# Patient Record
Sex: MALE | Race: WHITE | NOT HISPANIC OR LATINO | Employment: FULL TIME | URBAN - METROPOLITAN AREA
[De-identification: names, ages, dates, MRNs, and addresses within clinical notes are randomized per-mention and may not be internally consistent; named-entity substitution may affect disease eponyms.]

---

## 2019-10-05 ENCOUNTER — TRANSFERRED RECORDS (OUTPATIENT)
Dept: HEALTH INFORMATION MANAGEMENT | Facility: CLINIC | Age: 54
End: 2019-10-05

## 2020-06-16 ENCOUNTER — OFFICE VISIT (OUTPATIENT)
Dept: FAMILY MEDICINE | Facility: CLINIC | Age: 55
End: 2020-06-16
Payer: COMMERCIAL

## 2020-06-16 VITALS
HEIGHT: 70 IN | WEIGHT: 217.8 LBS | TEMPERATURE: 96.2 F | BODY MASS INDEX: 31.18 KG/M2 | SYSTOLIC BLOOD PRESSURE: 126 MMHG | OXYGEN SATURATION: 97 % | HEART RATE: 62 BPM | RESPIRATION RATE: 14 BRPM | DIASTOLIC BLOOD PRESSURE: 96 MMHG

## 2020-06-16 DIAGNOSIS — M25.572 ACUTE LEFT ANKLE PAIN: Primary | ICD-10-CM

## 2020-06-16 DIAGNOSIS — I81 PORTAL VEIN THROMBOSIS: ICD-10-CM

## 2020-06-16 PROCEDURE — 99204 OFFICE O/P NEW MOD 45 MIN: CPT | Performed by: FAMILY MEDICINE

## 2020-06-16 RX ORDER — ACETAMINOPHEN 500 MG
500-1000 TABLET ORAL EVERY 4 HOURS PRN
Start: 2020-06-16

## 2020-06-16 RX ORDER — FEXOFENADINE HCL 60 MG/1
60 TABLET, FILM COATED ORAL
COMMUNITY
Start: 2019-08-29

## 2020-06-16 ASSESSMENT — MIFFLIN-ST. JEOR: SCORE: 1844.18

## 2020-06-16 NOTE — PATIENT INSTRUCTIONS
Acetaminophen 500 mg orally 1-2 tablets every 4-6 hrs as needed for pain.  Avoid ibuprofen or naproxen since you are taking xarelto.    Schedule physical therapy.    If with worsening pain or pain interfering with activities after the next 1-2 weeks, follow up in the clinic.    Activity as tolerated.  Stop any activity that increases pain.    See your deep vein thrombosis specialist as planned.      Patient Education     Arthralgia    Arthralgia is the term for pain in or around the joint. It is a symptom, not a disease. This pain may involve one or more joints. In some cases, the pain moves from joint to joint.  There are many causes for joint pain. These include:    Injury    Osteoarthritis (wearing out of the joint surface)    Gout (inflammation of the joint due to crystals in the joint fluid)    Infection inside the joint      Bursitis (inflammation of the fluid-filled sacs around the joint)    Autoimmune disorders such as rheumatoid arthritis or lupus    Tendonitis (inflammation of chords that attach muscle to bone)  Home care    Rest the involved joint(s) until your symptoms improve.     You may be prescribed pain medicine. If none is prescribed, you may use acetaminophen or ibuprofen to control pain and inflammation.    Follow-up care  Follow up with your healthcare provider or as advised.  When to seek medical advice  Contact your healthcare provider right away if any of the following occurs:    Pain, swelling, or redness of joint increases    Pain worsens or recurs after a period of improvement    Pain moves to other joints    You cannot bear weight on the affected joint     You cannot move the affected joint    Joint appears deformed    New rash appears    Fever of 100.4 F (38 C) or higher, or as directed by your healthcare provider  Date Last Reviewed: 3/1/2017    5820-7253 [employer]. [address] [place] [address], [place] [address]. All rights reserved. This information is not intended as a  substitute for professional medical care. Always follow your healthcare professional's instructions.

## 2020-06-16 NOTE — PROGRESS NOTES
Subjective     Jez Barron is a 54 year old male who presents to clinic today for the following health issues:  Chief Complaint   Patient presents with     Establish Care     Pt here to establish care with new provider.     Musculoskeletal Problem     Pt also having left foot pain.       HPI   Joint Pain    Onset: 4-5 days    Description:   Location: left foot, more on the side  Character: Sharp and Dull ache    Intensity: 1-6/10    Progression of Symptoms: worse with walking    Accompanying Signs & Symptoms:  Other symptoms: radiation of pain to down into lower foot and weakness of ankle if he twists or moves wrong     History:   Previous similar pain: no       Precipitating factors:   Trauma or overuse: no   Denies new activities.  Patient states he just started a walking exercise program last week.    Alleviating factors:  Improved by: acetaminophen helps a little    Therapies Tried and outcome: stretching does not help    Patient has been diagnosed with portal vein thrombosis, and is seeing a gastroenterologist.  No need for any referrals per patient.        Patient Active Problem List   Diagnosis     Portal vein thrombosis     History reviewed. No pertinent surgical history.    Social History     Tobacco Use     Smoking status: Never Smoker     Smokeless tobacco: Never Used   Substance Use Topics     Alcohol use: Yes     Comment: social     Family History   Problem Relation Age of Onset     Lupus Sister      Heart Disease Sister      Cancer Maternal Uncle          Current Outpatient Medications   Medication Sig Dispense Refill     acetaminophen (TYLENOL) 500 MG tablet Take 1-2 tablets (500-1,000 mg) by mouth every 4 hours as needed for pain       fexofenadine (ALLEGRA) 60 MG tablet Take 60 mg by mouth       rivaroxaban ANTICOAGULANT (XARELTO) 10 MG TABS tablet Take 10 mg by mouth       No Known Allergies  Reviewed and updated as needed this visit by Provider         Review of Systems   Constitutional,  HEENT, cardiovascular, pulmonary, GI, , musculoskeletal, neuro, skin, endocrine and psych systems are negative, except as otherwise noted.      Objective    There were no vitals taken for this visit.  There is no height or weight on file to calculate BMI.  Physical Exam   GEN: alert, oriented x 3, NAD, ambulatory with mild antalgia  LEFT FOOT/ ANKLE: no deformity/discoloration/swelling; mild TTP just distal to the lateral malleolus with no palpable crepitation or mass; full range of motion with mild pain on inversion only intermittently; no instability; no tenderness on toes    Diagnostic Test Results:  none         Assessment & Plan     Jez was seen today for establish care and musculoskeletal problem.    Diagnoses and all orders for this visit:    Acute left ankle pain  -     acetaminophen (TYLENOL) 500 MG tablet; Take 1-2 tablets (500-1,000 mg) by mouth every 4 hours as needed for pain  -     PHYSICAL THERAPY REFERRAL; Future  No red flag signs to suspect fracture. Defer imaging.  Possibly recent strain from new exercise routine.  Discussed safe use of acetaminophen.  Discussed benefits of physical therapy. Patient concurs.  Activity as tolerated.  Return precautions discussed and given to patient.    Portal vein thrombosis  Sees GI.      Patient Instructions   Acetaminophen 500 mg orally 1-2 tablets every 4-6 hrs as needed for pain.  Avoid ibuprofen or naproxen since you are taking xarelto.    Schedule physical therapy.    If with worsening pain or pain interfering with activities after the next 1-2 weeks, follow up in the clinic.    Activity as tolerated.  Stop any activity that increases pain.    See your deep vein thrombosis specialist as planned.      Patient Education     Arthralgia    Arthralgia is the term for pain in or around the joint. It is a symptom, not a disease. This pain may involve one or more joints. In some cases, the pain moves from joint to joint.  There are many causes for joint pain.  These include:    Injury    Osteoarthritis (wearing out of the joint surface)    Gout (inflammation of the joint due to crystals in the joint fluid)    Infection inside the joint      Bursitis (inflammation of the fluid-filled sacs around the joint)    Autoimmune disorders such as rheumatoid arthritis or lupus    Tendonitis (inflammation of chords that attach muscle to bone)  Home care    Rest the involved joint(s) until your symptoms improve.     You may be prescribed pain medicine. If none is prescribed, you may use acetaminophen or ibuprofen to control pain and inflammation.    Follow-up care  Follow up with your healthcare provider or as advised.  When to seek medical advice  Contact your healthcare provider right away if any of the following occurs:    Pain, swelling, or redness of joint increases    Pain worsens or recurs after a period of improvement    Pain moves to other joints    You cannot bear weight on the affected joint     You cannot move the affected joint    Joint appears deformed    New rash appears    Fever of 100.4 F (38 C) or higher, or as directed by your healthcare provider  Date Last Reviewed: 3/1/2017    0652-5209 [employer]. [address] [place] [address]. All rights reserved. This information is not intended as a substitute for professional medical care. Always follow your healthcare professional's instructions.               Return in about 2 weeks (around 6/30/2020) for if worsening.    Alireza Burrows MD  [facility]

## 2020-07-10 ENCOUNTER — HOSPITAL ENCOUNTER (OUTPATIENT)
Dept: PHYSICAL THERAPY | Facility: CLINIC | Age: 55
Setting detail: THERAPIES SERIES
End: 2020-07-10
Attending: FAMILY MEDICINE
Payer: COMMERCIAL

## 2020-07-10 DIAGNOSIS — M25.572 ACUTE LEFT ANKLE PAIN: ICD-10-CM

## 2020-07-10 PROCEDURE — 97110 THERAPEUTIC EXERCISES: CPT | Mod: GP | Performed by: PHYSICAL THERAPIST

## 2020-07-10 PROCEDURE — 97161 PT EVAL LOW COMPLEX 20 MIN: CPT | Mod: GP | Performed by: PHYSICAL THERAPIST

## 2020-07-10 NOTE — PROGRESS NOTES
07/10/20 0800   General Information   Type of Visit Initial OP Ortho PT Evaluation   Start of Care Date 07/10/20   Referring Physician Dr Mariusz Burrows   Patient/Family Goals Statement decreased pain; get back to jogging   Orders Evaluate and Treat   Date of Order 06/16/20   Medical Diagnosis acute L ankle pain   Surgical/Medical history reviewed Yes   Precautions/Limitations no known precautions/limitations   Body Part(s)   Body Part(s) Ankle/Foot   Presentation and Etiology   Pertinent history of current problem (include personal factors and/or comorbidities that impact the POC) 56 yo male c/o L ankle pain 6/10 with push off duriing, stairs; does not put full wt on L foot duriing ADLs due to pain; PMH unremarkable; reports he had ACL repair but no scars are present   Impairments A. Pain;B. Decreased WB tolerance   Functional Limitations perform desired leisure / sports activities   Symptom Location L lateral ankle;    How/Where did it occur From insidious onset   Onset date of current episode/exacerbation 06/16/20  (date of PT order)   Pain rating (0-10 point scale) Best (/10);Worst (/10)   Best (/10) 2   Worst (/10) 6-8   Pain quality A. Sharp;C. Aching   Frequency of pain/symptoms C. With activity   Pain/symptoms exacerbated by B. Walking   Pain/symptoms eased by C. Rest   Progression of symptoms since onset: Worsened   Prior Level of Function   Functional Level Prior Comment stopped working out due to pain   Current Level of Function   [facility]   Home/community accessibility stairs to basement bedroom   Fall Risk Screen   Fall screen completed by PT   Have you fallen 2 or more times in the past year? No   Is patient a fall risk? No   Ankle/Foot Objective Findings   Side (if bilateral, select both right and left) Left   Observation NAD    Integumentary no swelling, bruising    Posture Normal   Gait/Locomotion slight antalgic gait later in PT eval; initially no deviation   Balance/  Proprioception (Single Leg Stance) >30 sec EO   Foot Position In Standing supinated    Ankle/Foot ROM Comment painful   Palpation tender deltoid ligament    Left DF (Knee Ext) AROM 0, 18 knee flxn   Left DF/Inversion Strength 4+ pain   Left DF/Eversion Strength 4 pain   Left PF/Inversion Strength 5-    Left PF/Eversion Strength 5- less pain   Left PF Strength 5- pain in standing    Left Gastroc (in WB) Flexibility 25   Planned Therapy Interventions   Planned Therapy Interventions balance training;manual therapy;strengthening;stretching;ROM   Clinical Impression   Criteria for Skilled Therapeutic Interventions Met yes, treatment indicated   PT Diagnosis ankle pain and weakness   Influenced by the following impairments pain    Functional limitations due to impairments pain with ADLs   Clinical Presentation Stable/Uncomplicated   Clinical Decision Making (Complexity) Low complexity   Predicted Duration of Therapy Intervention (days/wks) 10 weeks   Risk & Benefits of therapy have been explained Yes   Patient, Family & other staff in agreement with plan of care Yes   Clinical Impression Comments pt will benefit from skilled interventions to decrease pain, improve strength, ROM, functional moility    ORTHO GOALS   PT Ortho Eval Goals 1;2;3;4   Ortho Goal 1   Goal Description jogging 1 mile without pain   Target Date 09/18/20   Ortho Goal 2   Goal Description walk without pain   Target Date 08/07/20   Ortho Goal 3   Goal Description up/down full flight of stairs no pain   Target Date 08/21/20   Ortho Goal 4   Goal Description independent in HEP incorporated into regular fitness routine   Target Date 09/18/20   Total Evaluation Time   PT Anastasiya, Low Complexity Minutes (12099) 10

## 2020-07-13 ENCOUNTER — E-VISIT (OUTPATIENT)
Dept: FAMILY MEDICINE | Facility: CLINIC | Age: 55
End: 2020-07-13
Payer: COMMERCIAL

## 2020-07-13 DIAGNOSIS — Z86.19 HISTORY OF SHINGLES: Primary | ICD-10-CM

## 2020-07-13 PROCEDURE — 99207 ZZC NO BILLABLE SERVICE THIS VISIT: CPT | Performed by: FAMILY MEDICINE

## 2020-07-13 NOTE — PATIENT INSTRUCTIONS
Thank you for choosing us for your care. I think an in-clinic visit would be best next steps based on your symptoms. Please schedule a clinic appointment; you won t be charged for this e-visit.      You can schedule an appointment right here in [facility], or call [phone]

## 2020-07-15 ENCOUNTER — OFFICE VISIT (OUTPATIENT)
Dept: FAMILY MEDICINE | Facility: CLINIC | Age: 55
End: 2020-07-15
Payer: COMMERCIAL

## 2020-07-15 ENCOUNTER — HOSPITAL ENCOUNTER (OUTPATIENT)
Dept: PHYSICAL THERAPY | Facility: CLINIC | Age: 55
Setting detail: THERAPIES SERIES
End: 2020-07-15
Attending: FAMILY MEDICINE
Payer: COMMERCIAL

## 2020-07-15 VITALS
WEIGHT: 220.6 LBS | SYSTOLIC BLOOD PRESSURE: 138 MMHG | TEMPERATURE: 97.8 F | BODY MASS INDEX: 31.58 KG/M2 | HEIGHT: 70 IN | DIASTOLIC BLOOD PRESSURE: 88 MMHG | HEART RATE: 71 BPM | OXYGEN SATURATION: 97 %

## 2020-07-15 DIAGNOSIS — B02.9 HERPES ZOSTER WITHOUT COMPLICATION: Primary | ICD-10-CM

## 2020-07-15 PROCEDURE — 97110 THERAPEUTIC EXERCISES: CPT | Mod: GP | Performed by: PHYSICAL THERAPIST

## 2020-07-15 PROCEDURE — 99213 OFFICE O/P EST LOW 20 MIN: CPT | Performed by: FAMILY MEDICINE

## 2020-07-15 RX ORDER — VALACYCLOVIR HYDROCHLORIDE 1 G/1
1000 TABLET, FILM COATED ORAL 3 TIMES DAILY
Qty: 21 TABLET | Refills: 0 | Status: SHIPPED | OUTPATIENT
Start: 2020-07-15 | End: 2020-08-17

## 2020-07-15 RX ORDER — OXYCODONE HYDROCHLORIDE 5 MG/1
5 TABLET ORAL EVERY 6 HOURS PRN
Qty: 10 TABLET | Refills: 0 | Status: SHIPPED | OUTPATIENT
Start: 2020-07-15 | End: 2020-08-17

## 2020-07-15 ASSESSMENT — MIFFLIN-ST. JEOR: SCORE: 1851.89

## 2020-07-15 NOTE — PROGRESS NOTES
Subjective     Jez Barron is a 55 year old male who presents to clinic today for the following health issues:    HPI     Chief Complaint   Patient presents with     Derm Problem     Recurrent Shingles Rash on Right Thigh      Health Maintenance     patient had colonoscopy at [facility] 10/2019- repeat 10 yrs, sent message to [employer].        Rash  Onset: 3 days ago     Description:   Location: Right Thigh   Character: raised, red  Itching (Pruritis): no     Progression of Symptoms:  worsening    Accompanying Signs & Symptoms:  Fever: no   Body aches or joint pain: YES  Sore throat symptoms: no   Recent cold symptoms: no     History:   Previous similar rash: YES- Same spot - Shingles     Precipitating factors:   Exposure to similar rash: no   New exposures: None   Recent travel: no     Alleviating factors:  None     Therapies Tried and outcome: none     Verified above history with patient.      Patient Active Problem List   Diagnosis     Portal vein thrombosis     History reviewed. No pertinent surgical history.    Social History     Tobacco Use     Smoking status: Never Smoker     Smokeless tobacco: Never Used   Substance Use Topics     Alcohol use: Yes     Comment: social     Family History   Problem Relation Age of Onset     Lupus Sister      Heart Disease Sister      Cancer Maternal Uncle          Current Outpatient Medications   Medication Sig Dispense Refill     acetaminophen (TYLENOL) 500 MG tablet Take 1-2 tablets (500-1,000 mg) by mouth every 4 hours as needed for pain       fexofenadine (ALLEGRA) 60 MG tablet Take 60 mg by mouth       oxyCODONE (ROXICODONE) 5 MG tablet Take 1 tablet (5 mg) by mouth every 6 hours as needed for moderate to severe pain 10 tablet 0     rivaroxaban ANTICOAGULANT (XARELTO) 10 MG TABS tablet Take 10 mg by mouth daily        valACYclovir (VALTREX) 1000 mg tablet Take 1 tablet (1,000 mg) by mouth 3 times daily for 7 days 21 tablet 0     No Known Allergies    Reviewed  "and updated as needed this visit by Provider         Review of Systems     C: NEGATIVE for fever, chills, change in weight  ENT: NEG for nasal congestion, sore throat, ear pain or sinus pain  INTEGUMENTARY/SKIN: see above  MUSCULOSKELETAL: see above  H: NEGATIVE for bleeding problems      Objective    /88 (BP Location: Right arm, Patient Position: Chair, Cuff Size: Adult Regular)   Pulse 71   Temp 97.8  F (36.6  C) (Tympanic)   Ht 1.778 m (5' 10\")   Wt 100.1 kg (220 lb 9.6 oz)   SpO2 97%   BMI 31.65 kg/m    Body mass index is 31.65 kg/m .  Physical Exam   GEN: alert, oriented x 3, NAD  SKIN: good turgor; one cluster of vesicles with erythematous haloes on the right medial proximal thigh    Diagnostic Test Results:  none         Assessment & Plan     Jez was seen today for derm problem and health maintenance.    Diagnoses and all orders for this visit:    Herpes zoster without complication  -     valACYclovir (VALTREX) 1000 mg tablet; Take 1 tablet (1,000 mg) by mouth 3 times daily for 7 days  -     oxyCODONE (ROXICODONE) 5 MG tablet; Take 1 tablet (5 mg) by mouth every 6 hours as needed for moderate to severe pain      By history, consistent with HSV  Discussed cause, course and treatment of shingles.  Antiviral may decrease intensity and shorten course of the rash and pain.  Symptom may persist for several days to a few weeks.  Discussed may have severe pain - offered limited use RX analgesic. Patient concurred. Advised safe use of oxycodone.  If with severe pain, shortness of breath, spreading rash or other worrisome sx, see provider again.  May have shingles vaccine in a few months if desired.  Return precautions discussed and given to patient.          Patient Instructions   If you take oxycodone for severe pain later on, avoid drinking alcohol with it, and avoid driving while taking the pain medication.    Patient Education     Shingles  Shingles is a viral infection caused by the same virus " that causes chicken pox. Anyone who has had chicken pox may get shingles later in life. The virus stays in the body, but remains asleep (dormant). Shingles often occurs in older persons or persons with lowered immunity. But it can affect anyone at any age.  Shingles starts as a tingling patch of skin on one side of the body. Small, painful blisters may then appear. The rash rarely spreads to other parts of the body.  Exposure to shingles can't cause shingles. However, it can cause chicken pox in anyone who has not had chicken pox or has not been vaccinated. The contagious period ends when all blisters have crusted over, generally 1 to 2 weeks after the illness starts.  After the blisters heal, the affected skin may be sensitive or painful for weeks or months, gradually resolving over time. But, sometimes this can last longer and be permanent (called postherpetic neuralgia.)  Shingles vaccines are available. Vaccination can help prevent shingles or make it less painful. It is generally recommended for adults older than 50, even if you've had singles in the past. Talk with your healthcare provider about when to get vaccinated and which vaccine is best for you.  Home care    Medicines may be prescribed to help relieve pain. Take these medicines as directed. Ask your healthcare provider or pharmacist before using over-the-counter medicines for helping treat pain and itching.    In certain cases, antiviral medicines may be prescribed to reduce pain, shorten the illness, and prevent neuralgia. Take these medicines as directed.    Compresses made from a solution of cool water mixed with cornstarch or baking soda may help relieve pain and itching.     Gently wash skin daily with soap and water to help prevent infection. Be certain to rinse off all of the soap, which can be irritating.    Trim fingernails and try not to scratch. Scratching the sores may leave scars.    Stay home from work or school until all blisters have  formed a crust and you are no longer contagious.  Follow-up care  Follow up with your healthcare provider, or as directed.  When to seek medical advice    Fever of 100.4 F (38 C) or higher, or as directed by your healthcare provider    Affected skin is on the face or neck and any of the following occur:  ? Headache  ? Eye pain  ? Changes in vision  ? Sores near the eye  ? Weakness of facial muscles    Blisters occurring on new areas of the body    Pain, redness, or swelling of a joint    Signs of skin infection: colored drainage from the sores, warmth, increasing redness, fever, or increasing pain  Date Last Reviewed: 4/1/2018 2000-2019 [employer]. [address] [place] [address], [place] [address]. All rights reserved. This information is not intended as a substitute for professional medical care. Always follow your healthcare professional's instructions.               No follow-ups on file.    Alireza Burrows MD  [facility]

## 2020-07-15 NOTE — PATIENT INSTRUCTIONS
If you take oxycodone for severe pain later on, avoid drinking alcohol with it, and avoid driving while taking the pain medication.    Patient Education     Shingles  Shingles is a viral infection caused by the same virus that causes chicken pox. Anyone who has had chicken pox may get shingles later in life. The virus stays in the body, but remains asleep (dormant). Shingles often occurs in older persons or persons with lowered immunity. But it can affect anyone at any age.  Shingles starts as a tingling patch of skin on one side of the body. Small, painful blisters may then appear. The rash rarely spreads to other parts of the body.  Exposure to shingles can't cause shingles. However, it can cause chicken pox in anyone who has not had chicken pox or has not been vaccinated. The contagious period ends when all blisters have crusted over, generally 1 to 2 weeks after the illness starts.  After the blisters heal, the affected skin may be sensitive or painful for weeks or months, gradually resolving over time. But, sometimes this can last longer and be permanent (called postherpetic neuralgia.)  Shingles vaccines are available. Vaccination can help prevent shingles or make it less painful. It is generally recommended for adults older than 50, even if you've had singles in the past. Talk with your healthcare provider about when to get vaccinated and which vaccine is best for you.  Home care    Medicines may be prescribed to help relieve pain. Take these medicines as directed. Ask your healthcare provider or pharmacist before using over-the-counter medicines for helping treat pain and itching.    In certain cases, antiviral medicines may be prescribed to reduce pain, shorten the illness, and prevent neuralgia. Take these medicines as directed.    Compresses made from a solution of cool water mixed with cornstarch or baking soda may help relieve pain and itching.     Gently wash skin daily with soap and water to help  prevent infection. Be certain to rinse off all of the soap, which can be irritating.    Trim fingernails and try not to scratch. Scratching the sores may leave scars.    Stay home from work or school until all blisters have formed a crust and you are no longer contagious.  Follow-up care  Follow up with your healthcare provider, or as directed.  When to seek medical advice    Fever of 100.4 F (38 C) or higher, or as directed by your healthcare provider    Affected skin is on the face or neck and any of the following occur:  ? Headache  ? Eye pain  ? Changes in vision  ? Sores near the eye  ? Weakness of facial muscles    Blisters occurring on new areas of the body    Pain, redness, or swelling of a joint    Signs of skin infection: colored drainage from the sores, warmth, increasing redness, fever, or increasing pain  Date Last Reviewed: 4/1/2018 2000-2019 [employer]. [address] [place] [address]. All rights reserved. This information is not intended as a substitute for professional medical care. Always follow your healthcare professional's instructions.

## 2020-07-15 NOTE — Clinical Note
Please abstract the following data from this visit with this patient into the appropriate field in Epic:    Tests that can be patient reported without a hard copy:        Other Tests found in the patient's chart through Chart Review/Care Everywhere:    Colonoscopy done by [facility] on this date: 10/5/2019 repeat 10 yrs     Note to [employer]: If this section is blank, no results were found via Chart Review/Care Everywhere.

## 2020-07-22 ENCOUNTER — HOSPITAL ENCOUNTER (OUTPATIENT)
Dept: PHYSICAL THERAPY | Facility: CLINIC | Age: 55
Setting detail: THERAPIES SERIES
End: 2020-07-22
Attending: FAMILY MEDICINE
Payer: COMMERCIAL

## 2020-07-22 PROCEDURE — 97110 THERAPEUTIC EXERCISES: CPT | Mod: GP | Performed by: PHYSICAL THERAPIST

## 2020-08-07 ENCOUNTER — HOSPITAL ENCOUNTER (OUTPATIENT)
Dept: PHYSICAL THERAPY | Facility: CLINIC | Age: 55
Setting detail: THERAPIES SERIES
End: 2020-08-07
Attending: FAMILY MEDICINE
Payer: COMMERCIAL

## 2020-08-07 PROCEDURE — 97110 THERAPEUTIC EXERCISES: CPT | Mod: GP | Performed by: PHYSICAL THERAPIST

## 2020-08-12 ENCOUNTER — HOSPITAL ENCOUNTER (OUTPATIENT)
Dept: PHYSICAL THERAPY | Facility: CLINIC | Age: 55
Setting detail: THERAPIES SERIES
End: 2020-08-12
Attending: FAMILY MEDICINE
Payer: COMMERCIAL

## 2020-08-12 DIAGNOSIS — M25.572 ACUTE LEFT ANKLE PAIN: Primary | ICD-10-CM

## 2020-08-12 PROCEDURE — 97110 THERAPEUTIC EXERCISES: CPT | Mod: GP | Performed by: PHYSICAL THERAPIST

## 2020-08-12 NOTE — PROGRESS NOTES
Jez Barron  1965  Alireza Burrows MD  [address] [place] [address]  Diagnosis:  L ankle pain Physical Therapy Progress Note  08/12/20 1300   Signing Clinician's Name / Credentials   Signing clinician's name / credentials Myranda Stafford PT, DPT   Session Number   Session Number 5/365  [facility] 7/10/20   Ortho Goal 1   Goal Description jogging 1 mile without pain   Target Date 09/18/20   Ortho Goal 2   Goal Description walk without pain   Target Date 08/07/20   Ortho Goal 3   Goal Description up/down full flight of stairs no pain   Target Date 08/21/20   Ortho Goal 4   Goal Description independent in HEP incorporated into regular fitness routine   Target Date 09/18/20   Subjective Report   Subjective Report Pain with walking and whenever moving a little off to side; really no better than when started one month ago.    Objective Measures   Objective Measures Objective Measure 3;Objective Measure 4   Objective Measure 1   Objective Measure active ROM  measured in prone   Details 8* DF KE, 12  DF KF;  60* PF    Objective Measure 2   Objective Measure PROM   Details 30* DF KE  wt bearing; 0* calc peter; 30 inversion   Objective Measure 3   Objective Measure Pain L lateral ankle/foot   Details 1-4/10; Awakend 2-3x during the night taking tylenol with sleep aide   Objective Measure 4   Objective Measure palpation   Details tender to palpation deltoid ligament   Therapeutic Procedure/exercise   Therapeutic Procedures: strength, endurance, ROM, flexibillity minutes (52931) 10   Skilled Intervention education and exercise instruction with manual facilitation   Patient Response pain with resisted eversion in DF or PF   Treatment Detail review of Y TB peter doing OK; G TB PF   Communication with other professionals   Communication with other professionals contacted Dr Stormy aguilar lack of improvement   Plan   Home program continue previous as tolerated   Plan for next session  recommend seeing ortho podiatrist    Total Session Time   Timed Code Treatment Minutes 10   Total Treatment Time (sum of timed and untimed services) 10

## 2020-08-12 NOTE — PROGRESS NOTES
Received abrame from phys therapist. Patient's pain has not improved after a month of therapy.    Next steps:  1) schedule ankle xray in clinic  2) referred to podiatry - please inform patient that he will be called to schedule appointment by  Ortho .

## 2020-08-13 ENCOUNTER — HOSPITAL ENCOUNTER (OUTPATIENT)
Dept: GENERAL RADIOLOGY | Facility: CLINIC | Age: 55
Discharge: HOME | End: 2020-08-13
Attending: FAMILY MEDICINE | Admitting: FAMILY MEDICINE
Payer: COMMERCIAL

## 2020-08-13 DIAGNOSIS — M25.572 ACUTE LEFT ANKLE PAIN: ICD-10-CM

## 2020-08-13 PROCEDURE — 73610 X-RAY EXAM OF ANKLE: CPT | Mod: LT

## 2020-08-13 NOTE — PROGRESS NOTES
I have called the patient and he already has an appt with Dr. Mock for Monday.  I spoke to our xray tech and she is looking into if Dr. Mock wants to do his own xrays.  We will call the patient back. Carolin SALOMON RN

## 2020-08-13 NOTE — PROGRESS NOTES
Per our clinic xray skye Masters.  She spoke to [facility] and they want to do the xray of ankle.  I have called the patient and given the general central scheduling number I was given to have patient call [phone] and ask to schedule xray with [facility]. Carolin SALOMON RN

## 2020-08-17 ENCOUNTER — OFFICE VISIT (OUTPATIENT)
Dept: PODIATRY | Facility: CLINIC | Age: 55
End: 2020-08-17
Payer: COMMERCIAL

## 2020-08-17 VITALS
BODY MASS INDEX: 31.5 KG/M2 | DIASTOLIC BLOOD PRESSURE: 85 MMHG | SYSTOLIC BLOOD PRESSURE: 128 MMHG | HEIGHT: 70 IN | WEIGHT: 220 LBS | HEART RATE: 59 BPM

## 2020-08-17 DIAGNOSIS — M76.72 PERONEAL TENDONITIS, LEFT: Primary | ICD-10-CM

## 2020-08-17 DIAGNOSIS — M25.572 ACUTE LEFT ANKLE PAIN: ICD-10-CM

## 2020-08-17 PROCEDURE — 99203 OFFICE O/P NEW LOW 30 MIN: CPT | Performed by: PODIATRIST

## 2020-08-17 ASSESSMENT — PAIN SCALES - GENERAL: PAINLEVEL: SEVERE PAIN (7)

## 2020-08-17 ASSESSMENT — MIFFLIN-ST. JEOR: SCORE: 1849.16

## 2020-08-17 NOTE — LETTER
8/17/2020         RE: Jez Barron  [address] [place] [facility] [address]        Dear Colleague,    Thank you for referring your patient, Jez Barron, to the [facility]. Please see a copy of my visit note below.    PATIENT HISTORY:  Jez Barron is a 55 year old male who presents to clinic for a painful left foot .  The patient describes the pain as sharp stabbing.  The patient relates the pain level is moderate.  The patient relates pain is located over the outside of the left foot and ankle.  The patient relates the pain has been present for the past several weeks.  The patient relates pain with ambulation.  The patient has tried shoes with little relief.       REVIEW OF SYSTEMS:  Constitutional, HEENT, cardiovascular, pulmonary, GI, , musculoskeletal, neuro, skin, endocrine and psych systems are negative, except as otherwise noted.     PAST MEDICAL HISTORY: No past medical history on file.     PAST SURGICAL HISTORY: No past surgical history on file.     MEDICATIONS:   Current Outpatient Medications:      acetaminophen (TYLENOL) 500 MG tablet, Take 1-2 tablets (500-1,000 mg) by mouth every 4 hours as needed for pain, Disp: , Rfl:      fexofenadine (ALLEGRA) 60 MG tablet, Take 60 mg by mouth, Disp: , Rfl:      rivaroxaban ANTICOAGULANT (XARELTO) 10 MG TABS tablet, Take 10 mg by mouth daily , Disp: , Rfl:      ALLERGIES:  No Known Allergies     SOCIAL HISTORY:   Social History     Socioeconomic History     Marital status:      Spouse name: Not on file     Number of children: Not on file     Years of education: Not on file     Highest education level: Not on file   Occupational History     Not on file   Social Needs     Financial resource strain: Not on file     Food insecurity     Worry: Not on file     Inability: Not on file     Transportation needs     Medical: Not on file     Non-medical: Not on file   Tobacco Use     Smoking status: Never  "Smoker     Smokeless tobacco: Never Used   Substance and Sexual Activity     Alcohol use: Yes     Comment: social     Drug use: Never     Sexual activity: Not on file   Lifestyle     Physical activity     Days per week: Not on file     Minutes per session: Not on file     Stress: Not on file   Relationships     Social connections     Talks on phone: Not on file     Gets together: Not on file     Attends [employer] service: Not on file     Active member of club or organization: Not on file     Attends meetings of clubs or organizations: Not on file     Relationship status: Not on file     Intimate partner violence     Fear of current or ex partner: Not on file     Emotionally abused: Not on file     Physically abused: Not on file     Forced sexual activity: Not on file   Other Topics Concern     Not on file   Social History Narrative     Not on file        FAMILY HISTORY:   Family History   Problem Relation Age of Onset     Lupus Sister      Heart Disease Sister      Cancer Maternal Uncle         EXAM:Vitals: /85   Pulse 59   Ht 1.778 m (5' 10\")   Wt 99.8 kg (220 lb)   BMI 31.57 kg/m    BMI= Body mass index is 31.57 kg/m .    Weight management plan: Patient was referred to their PCP to discuss a diet and exercise plan.    General appearance: Patient is alert and fully cooperative with history & exam.  No sign of distress is noted during the visit.     Psychiatric: Affect is pleasant & appropriate.  Patient appears motivated to improve health.     Respiratory: Breathing is regular & unlabored while sitting.     HEENT: Hearing is intact to spoken word.  Speech is clear.  No gross evidence of visual impairment that would impact ambulation.     Dermatologic: Skin is intact to left lower extremities without significant lesions, rash or abrasion.  No paronychia or evidence of soft tissue infection is noted.     Vascular: DP & PT pulses are intact & regular on the left.  No significant edema or varicosities noted.  " CFT and skin temperature is normal to the left lower extremities.     Neurologic: Lower extremity sensation is intact to light touch.  No evidence of weakness or contracture in the left lower extremities.  No evidence of neuropathy.     Musculoskeletal: Patient is ambulatory without assistive device or brace.  No gross ankle deformity noted.  No foot or ankle joint effusion is noted.  Noted pain on palpation of the peroneal tendons on the left foot and ankle.  One notes pain with pronation and plantar flexion against resistance on the left.  No surrounding erythema noted.    Recent radiographs were evaluated including AP, lateral and mortise views of the left ankle reveals  no cortical erosions or periosteal elevation.  All joint margins appear stable.  There is no apparent fracture or tumor formation noted.  There is no evidence of foreign body.    ASSESSMENT / PLAN:     ICD-10-CM    1. Peroneal tendonitis, left  M76.72    2. Acute left ankle pain  M25.572 Orthopedic & Spine  Referral       I have explained to Jez  about the conditions.  We discussed the nature of the condition as well as the treatment plan and expected length of recovery.  At this time, the patient was instructed on icing, stretching, tissue massage and support.  The patient was fitted with a Tri-Lock ankle brace that will aid in offloading the tension forces to the soft tissues and prevent further inflammation.  To reduce the amount of current inflammation, the patient was prescribed Voltaren gel to be applied to the peroneal tendon along with deep tissue massage.  The patient will return in four weeks for reevaluation if the symptoms do not resolve.      Jez verbalized agreement with and understanding of the rational for the diagnosis and treatment plan.  All questions were answered to best of my ability and the patient's satisfaction. The patient was advised to contact the clinic with any questions that may arise after the  clinic visit.      Disclaimer: This note consists of symbols derived from keyboarding, dictation and/or voice recognition software. As a result, there may be errors in the script that have gone undetected. Please consider this when interpreting information found in this chart.       BUTCH Littlejohn D.P.M., [email or web address].S.        Again, thank you for allowing me to participate in the care of your patient.        Sincerely,        Arcenio Littlejohn DPM

## 2020-08-17 NOTE — NURSING NOTE
"Chief Complaint   Patient presents with     Consult     \"started in June-Pain in lateral side of left ankle\" XR 8/13/20       Initial /85   Pulse 59   Ht 1.778 m (5' 10\")   Wt 99.8 kg (220 lb)   BMI 31.57 kg/m   Estimated body mass index is 31.57 kg/m  as calculated from the following:    Height as of this encounter: 1.778 m (5' 10\").    Weight as of this encounter: 99.8 kg (220 lb).  Medications and allergies reviewed.      Leti LANZA MA    "

## 2020-08-17 NOTE — PROGRESS NOTES
PATIENT HISTORY:  Jez Barron is a 55 year old male who presents to clinic for a painful left foot .  The patient describes the pain as sharp stabbing.  The patient relates the pain level is moderate.  The patient relates pain is located over the outside of the left foot and ankle.  The patient relates the pain has been present for the past several weeks.  The patient relates pain with ambulation.  The patient has tried shoes with little relief.       REVIEW OF SYSTEMS:  Constitutional, HEENT, cardiovascular, pulmonary, GI, , musculoskeletal, neuro, skin, endocrine and psych systems are negative, except as otherwise noted.     PAST MEDICAL HISTORY: No past medical history on file.     PAST SURGICAL HISTORY: No past surgical history on file.     MEDICATIONS:   Current Outpatient Medications:      acetaminophen (TYLENOL) 500 MG tablet, Take 1-2 tablets (500-1,000 mg) by mouth every 4 hours as needed for pain, Disp: , Rfl:      fexofenadine (ALLEGRA) 60 MG tablet, Take 60 mg by mouth, Disp: , Rfl:      rivaroxaban ANTICOAGULANT (XARELTO) 10 MG TABS tablet, Take 10 mg by mouth daily , Disp: , Rfl:      ALLERGIES:  No Known Allergies     SOCIAL HISTORY:   Social History     Socioeconomic History     Marital status:      Spouse name: Not on file     Number of children: Not on file     Years of education: Not on file     Highest education level: Not on file   Occupational History     Not on file   Social Needs     Financial resource strain: Not on file     Food insecurity     Worry: Not on file     Inability: Not on file     Transportation needs     Medical: Not on file     Non-medical: Not on file   Tobacco Use     Smoking status: Never Smoker     Smokeless tobacco: Never Used   Substance and Sexual Activity     Alcohol use: Yes     Comment: social     Drug use: Never     Sexual activity: Not on file   Lifestyle     Physical activity     Days per week: Not on file     Minutes per session: Not on file      "Stress: Not on file   Relationships     Social connections     Talks on phone: Not on file     Gets together: Not on file     Attends [employer] service: Not on file     Active member of club or organization: Not on file     Attends meetings of clubs or organizations: Not on file     Relationship status: Not on file     Intimate partner violence     Fear of current or ex partner: Not on file     Emotionally abused: Not on file     Physically abused: Not on file     Forced sexual activity: Not on file   Other Topics Concern     Not on file   Social History Narrative     Not on file        FAMILY HISTORY:   Family History   Problem Relation Age of Onset     Lupus Sister      Heart Disease Sister      Cancer Maternal Uncle         EXAM:Vitals: /85   Pulse 59   Ht 1.778 m (5' 10\")   Wt 99.8 kg (220 lb)   BMI 31.57 kg/m    BMI= Body mass index is 31.57 kg/m .    Weight management plan: Patient was referred to their PCP to discuss a diet and exercise plan.    General appearance: Patient is alert and fully cooperative with history & exam.  No sign of distress is noted during the visit.     Psychiatric: Affect is pleasant & appropriate.  Patient appears motivated to improve health.     Respiratory: Breathing is regular & unlabored while sitting.     HEENT: Hearing is intact to spoken word.  Speech is clear.  No gross evidence of visual impairment that would impact ambulation.     Dermatologic: Skin is intact to left lower extremities without significant lesions, rash or abrasion.  No paronychia or evidence of soft tissue infection is noted.     Vascular: DP & PT pulses are intact & regular on the left.  No significant edema or varicosities noted.  CFT and skin temperature is normal to the left lower extremities.     Neurologic: Lower extremity sensation is intact to light touch.  No evidence of weakness or contracture in the left lower extremities.  No evidence of neuropathy.     Musculoskeletal: Patient is " ambulatory without assistive device or brace.  No gross ankle deformity noted.  No foot or ankle joint effusion is noted.  Noted pain on palpation of the peroneal tendons on the left foot and ankle.  One notes pain with pronation and plantar flexion against resistance on the left.  No surrounding erythema noted.    Recent radiographs were evaluated including AP, lateral and mortise views of the left ankle reveals  no cortical erosions or periosteal elevation.  All joint margins appear stable.  There is no apparent fracture or tumor formation noted.  There is no evidence of foreign body.    ASSESSMENT / PLAN:     ICD-10-CM    1. Peroneal tendonitis, left  M76.72    2. Acute left ankle pain  M25.572 Orthopedic & Spine  Referral       I have explained to Jez  about the conditions.  We discussed the nature of the condition as well as the treatment plan and expected length of recovery.  At this time, the patient was instructed on icing, stretching, tissue massage and support.  The patient was fitted with a Tri-Lock ankle brace that will aid in offloading the tension forces to the soft tissues and prevent further inflammation.  To reduce the amount of current inflammation, the patient was prescribed Voltaren gel to be applied to the peroneal tendon along with deep tissue massage.  The patient will return in four weeks for reevaluation if the symptoms do not resolve.      Jez verbalized agreement with and understanding of the rational for the diagnosis and treatment plan.  All questions were answered to best of my ability and the patient's satisfaction. The patient was advised to contact the clinic with any questions that may arise after the clinic visit.      Disclaimer: This note consists of symbols derived from keyboarding, dictation and/or voice recognition software. As a result, there may be errors in the script that have gone undetected. Please consider this when interpreting information found in  this chart.       BUTCH Littlejohn D.P.M., VERITO.SAM.

## 2020-11-30 ENCOUNTER — ANCILLARY PROCEDURE (OUTPATIENT)
Dept: GENERAL RADIOLOGY | Facility: CLINIC | Age: 55
End: 2020-11-30
Attending: NURSE PRACTITIONER
Payer: COMMERCIAL

## 2020-11-30 ENCOUNTER — TELEPHONE (OUTPATIENT)
Dept: FAMILY MEDICINE | Facility: CLINIC | Age: 55
End: 2020-11-30

## 2020-11-30 ENCOUNTER — OFFICE VISIT (OUTPATIENT)
Dept: FAMILY MEDICINE | Facility: CLINIC | Age: 55
End: 2020-11-30
Payer: COMMERCIAL

## 2020-11-30 VITALS
OXYGEN SATURATION: 97 % | RESPIRATION RATE: 12 BRPM | DIASTOLIC BLOOD PRESSURE: 88 MMHG | BODY MASS INDEX: 32.61 KG/M2 | WEIGHT: 227.3 LBS | SYSTOLIC BLOOD PRESSURE: 130 MMHG | HEART RATE: 90 BPM | TEMPERATURE: 97.1 F

## 2020-11-30 DIAGNOSIS — S92.514A CLOSED NONDISPLACED FRACTURE OF PROXIMAL PHALANX OF LESSER TOE OF RIGHT FOOT, INITIAL ENCOUNTER: Primary | ICD-10-CM

## 2020-11-30 DIAGNOSIS — Z79.01 ON CONTINUOUS ORAL ANTICOAGULATION: ICD-10-CM

## 2020-11-30 DIAGNOSIS — M79.674 PAIN OF TOE OF RIGHT FOOT: ICD-10-CM

## 2020-11-30 DIAGNOSIS — R03.0 ELEVATED BLOOD PRESSURE READING WITHOUT DIAGNOSIS OF HYPERTENSION: ICD-10-CM

## 2020-11-30 PROBLEM — I82.890 SPLENIC VEIN THROMBOSIS: Status: ACTIVE | Noted: 2019-09-06

## 2020-11-30 PROBLEM — M54.50 LOW BACK PAIN: Status: ACTIVE | Noted: 2019-12-08

## 2020-11-30 PROBLEM — K55.069 SUPERIOR MESENTERIC VEIN THROMBOSIS (H): Status: ACTIVE | Noted: 2019-09-06

## 2020-11-30 PROBLEM — G89.4 CHRONIC PAIN DISORDER: Status: ACTIVE | Noted: 2019-01-28

## 2020-11-30 PROCEDURE — 90471 IMMUNIZATION ADMIN: CPT | Performed by: NURSE PRACTITIONER

## 2020-11-30 PROCEDURE — 73660 X-RAY EXAM OF TOE(S): CPT | Mod: RT | Performed by: RADIOLOGY

## 2020-11-30 PROCEDURE — 90682 RIV4 VACC RECOMBINANT DNA IM: CPT | Performed by: NURSE PRACTITIONER

## 2020-11-30 PROCEDURE — 99214 OFFICE O/P EST MOD 30 MIN: CPT | Mod: 25 | Performed by: NURSE PRACTITIONER

## 2020-11-30 RX ORDER — OXYCODONE HYDROCHLORIDE 5 MG/1
5 TABLET ORAL EVERY 6 HOURS PRN
Qty: 12 TABLET | Refills: 0 | Status: SHIPPED | OUTPATIENT
Start: 2020-11-30 | End: 2020-12-03

## 2020-11-30 NOTE — TELEPHONE ENCOUNTER
Reason for call:    Symptom or request:     Patient called stating that he has bruising and taking glipizide. And was told to call clinic if experiencing bruising, he reports its on his right foot little toe, the area is red, swollen and purpish in color. Reports/no recall of injury. Able to walk on it, reports slight pain.     [facility] pharmacy       Best Time:  any    Can we leave a detailed message on this number?  YES     Uyen MCCABE  Station

## 2020-11-30 NOTE — PATIENT INSTRUCTIONS
Left toe pain:  1. X-ray noted fracture of the right pinky toe.   2. Take oxycodone as needed for pain.   3. You can also take Tylenol 1000mg every 8 hours (max 3000mg/day). Recommend taking 4 hours after Ibuprofen.  4. Rest, wear boot for 1-2 weeks, and elevate as able.  5. Follow-up in 1-2 weeks if symptoms do not improve or sooner if symptoms worsen.

## 2020-11-30 NOTE — NURSING NOTE
"Initial BP (!) 150/102   Pulse 90   Temp 97.1  F (36.2  C) (Tympanic)   Resp 12   Wt 103.1 kg (227 lb 4.8 oz)   SpO2 97%   BMI 32.61 kg/m   Estimated body mass index is 32.61 kg/m  as calculated from the following:    Height as of 8/17/20: 1.778 m (5' 10\").    Weight as of this encounter: 103.1 kg (227 lb 4.8 oz). .      "

## 2020-11-30 NOTE — TELEPHONE ENCOUNTER
Pt stubbed his toe yesterday.    Today it is very bruised and numb feeling.      Appt today at 3:20.    Melissa Concepcion RN

## 2020-11-30 NOTE — PROGRESS NOTES
Subjective     Jez Barron is a 55 year old male who presents to clinic today for the following health issues:    HPI         Musculoskeletal problem/pain      Onset/Duration: 11/29/20  Description  Location: foot - right lateral  Joint Swelling: YES  Redness: YES  Pain: YES  Warmth: YES   Intensity:  3/10 at rest 5/10 when walking  Progression of Symptoms:  same  Accompanying signs and symptoms:   Fevers: no  Numbness/tingling/weakness: YES numbness  History  Trauma to the area: YES, stubbed toe  Recent illness:  no  Previous similar problem: no  Previous evaluation:  no  Precipitating or alleviating factors:  Aggravating factors include: standing, walking and pressure  Therapies tried and outcome: ice and Tylenol q 6 hrs    On blood thinners.  Left foot/ankle issues in the past.    Additional provider notes: Stubbed toe yesterday. Now with a lot of bruising around pinky toe and painful.     Review of Systems   Constitutional: Negative.    Musculoskeletal:        Right 5th toe pain, swelling, bruising            Objective    /88 (BP Location: Right arm, Patient Position: Sitting, Cuff Size: Adult Large)   Pulse 90   Temp 97.1  F (36.2  C) (Tympanic)   Resp 12   Wt 103.1 kg (227 lb 4.8 oz)   SpO2 97%   BMI 32.61 kg/m    Body mass index is 32.61 kg/m .  Physical Exam  Vitals signs and nursing note reviewed.   Constitutional:       General: He is not in acute distress.     Appearance: Normal appearance. He is not ill-appearing or toxic-appearing.   Skin:     Comments: Right 5th toe with swelling, redness and bruising extending into foot, TTP   Neurological:      Mental Status: He is alert.                Assessment & Plan     Closed nondisplaced fracture of proximal phalanx of lesser toe of right foot, initial encounter  Acute fracture noted upon initial provider review. Will notify patient when official read from radiology comes through. CAM boot ordered and fitted patient with boot. Instructed he  should probably not drive with boot on, otherwise, wear even in the house. Oxycodone prescribed for pain at night to help with sleeping. Do not drive or operate heavy equipment while on medication.     - oxyCODONE (ROXICODONE) 5 MG tablet; Take 1 tablet (5 mg) by mouth every 6 hours as needed for pain  - Miscellaneous Order for DME - ONLY FOR DME    Elevated blood pressure reading without diagnosis of hypertension  Initially elevated in office. Recheck normal.     Pain of toe of right foot    - XR Toe Right G/E 2 Views; Future    On continuous oral anticoagulation  Continue to monitor bruising at home and follow-up if bruising worsens over the next couple days rather than stabilizes.           Patient Instructions   Left toe pain:  1. X-ray noted fracture of the right pinky toe.   2. Take oxycodone as needed for pain.   3. You can also take Tylenol 1000mg every 8 hours (max 3000mg/day). Recommend taking 4 hours after Ibuprofen.  4. Rest, wear boot for 1-2 weeks, and elevate as able.  5. Follow-up in 1-2 weeks if symptoms do not improve or sooner if symptoms worsen.        Return if symptoms worsen or fail to improve.    MERCED Anne [facility]

## 2020-12-01 ASSESSMENT — ENCOUNTER SYMPTOMS: CONSTITUTIONAL NEGATIVE: 1

## 2021-01-15 ENCOUNTER — HEALTH MAINTENANCE LETTER (OUTPATIENT)
Age: 56
End: 2021-01-15

## 2021-02-18 DIAGNOSIS — I81 PORTAL VEIN THROMBOSIS: Primary | ICD-10-CM

## 2021-02-18 NOTE — LETTER
February 25, 2021      Jez Barron  [address] [place] [address]        Dear Jez,     We received a refill request for your Xarelto medication.  This medication has been refilled for 30 days as you are due for an office visit for further refills.  Please call [phone] to schedule an appointment.        Sincerely,        MERCED Anne CNP

## 2021-02-19 NOTE — TELEPHONE ENCOUNTER
Requested Prescriptions   Pending Prescriptions Disp Refills     XARELTO ANTICOAGULANT 10 MG TABS tablet [Pharmacy Med Name: XARELTO 10MG TABS] 30 tablet 97     Sig: TAKE ONE TABLET BY MOUTH EVERY EVENING WITH A MEAL       Direct Oral Anticoagulant Agents Failed - 2/18/2021 11:38 AM        Failed - Normal Platelets on file in past 12 months     No lab results found.            Failed - Creatinine Clearance greater than 50 ml/min on file in past 3 mos     No lab results found.          Failed - Serum creatinine less than or equal to 1.4 on file in past 3 mos     No lab results found.    Ok to refill medication if creatinine is low          Passed - Medication is active on med list        Passed - Patient is 18 years of age or older        Passed - Recent (6 mo) or future (30 days) visit within the authorizing provider's specialty

## 2021-02-23 NOTE — TELEPHONE ENCOUNTER
Routing refill request to provider for review/approval because:  Labs out of range: Plt and creat.  Advise.  [id number]

## 2021-02-25 ENCOUNTER — VIRTUAL VISIT (OUTPATIENT)
Dept: FAMILY MEDICINE | Facility: CLINIC | Age: 56
End: 2021-02-25
Payer: COMMERCIAL

## 2021-02-25 DIAGNOSIS — I81 PORTAL VEIN THROMBOSIS: ICD-10-CM

## 2021-02-25 DIAGNOSIS — Z51.81 ENCOUNTER FOR THERAPEUTIC DRUG MONITORING: Primary | ICD-10-CM

## 2021-02-25 PROCEDURE — 99213 OFFICE O/P EST LOW 20 MIN: CPT | Mod: 95 | Performed by: FAMILY MEDICINE

## 2021-02-25 NOTE — PROGRESS NOTES
Jez is a 55 year old who is being evaluated via a billable telephone visit.      What phone number would you like to be contacted at? [phone]  How would you like to obtain your AVS? MyChart    Assessment & Plan     Portal vein thrombosis  Medication faxed. Patient will make lab appointment.   - rivaroxaban ANTICOAGULANT (XARELTO ANTICOAGULANT) 10 MG TABS tablet; Take 1 tablet (10 mg) by mouth daily (with dinner)    Encounter for therapeutic drug monitoring  - CBC with platelets FUTURE anytime; Future  - Creatinine FUTURE anytime; Future        FUTURE APPOINTMENTS:       - Follow-up visit in one month or sooner as needed.    Return in about 4 weeks (around 3/25/2021) for Follow up.    Sreedhar Triana MD  [facility]    Tyler Story is a 55 year old who presents for the following health issues  accompanied by his :    HPI   55 yr old male here for medication refills. He has a history of portal vein thrombosis on chronic anticoagulation on Xarelto, he needs refills of this medication.He has had no side effects taking this medication. Patient is informed of labs that he is due for. He will make an appointment for these labs.    Medication Followup of Xarelto    Taking Medication as prescribed: yes    Side Effects:  None    Medication Helping Symptoms:  yes         Review of Systems   Constitutional, HEENT, cardiovascular, pulmonary, gi and gu systems are negative, except as otherwise noted.      Objective           Vitals:  No vitals were obtained today due to virtual visit.    Physical Exam   healthy, alert and no distress  PSYCH: Alert and oriented times 3; coherent speech, normal   rate and volume, able to articulate logical thoughts, able   to abstract reason, no tangential thoughts, no hallucinations   or delusions  His affect is normal  RESP: No cough, no audible wheezing, able to talk in full sentences  Remainder of exam unable to be completed due to telephone  visits      Phone call duration: 5 minutes

## 2021-03-01 DIAGNOSIS — Z51.81 ENCOUNTER FOR THERAPEUTIC DRUG MONITORING: ICD-10-CM

## 2021-03-01 LAB
CREAT SERPL-MCNC: 1.07 MG/DL (ref 0.66–1.25)
ERYTHROCYTE [DISTWIDTH] IN BLOOD BY AUTOMATED COUNT: 12.8 % (ref 10–15)
GFR SERPL CREATININE-BSD FRML MDRD: 78 ML/MIN/{1.73_M2}
HCT VFR BLD AUTO: 44.8 % (ref 40–53)
HGB BLD-MCNC: 15.6 G/DL (ref 13.3–17.7)
MCH RBC QN AUTO: 30.3 PG (ref 26.5–33)
MCHC RBC AUTO-ENTMCNC: 34.8 G/DL (ref 31.5–36.5)
MCV RBC AUTO: 87 FL (ref 78–100)
PLATELET # BLD AUTO: 180 10E9/L (ref 150–450)
RBC # BLD AUTO: 5.15 10E12/L (ref 4.4–5.9)
WBC # BLD AUTO: 6.6 10E9/L (ref 4–11)

## 2021-03-01 PROCEDURE — 82565 ASSAY OF CREATININE: CPT | Performed by: FAMILY MEDICINE

## 2021-03-01 PROCEDURE — 36415 COLL VENOUS BLD VENIPUNCTURE: CPT | Performed by: FAMILY MEDICINE

## 2021-03-01 PROCEDURE — 85027 COMPLETE CBC AUTOMATED: CPT | Performed by: FAMILY MEDICINE

## 2021-03-03 NOTE — RESULT ENCOUNTER NOTE
Please inform patient that test result was within normal parameters.   Thank you.     Sreedhar Triana M.D.

## 2021-03-25 ENCOUNTER — IMMUNIZATION (OUTPATIENT)
Dept: NURSING | Facility: CLINIC | Age: 56
End: 2021-03-25
Payer: COMMERCIAL

## 2021-03-25 PROCEDURE — 91303 PR COVID VAC JANSSEN AD26 0.5ML: CPT

## 2021-03-25 PROCEDURE — 0031A PR COVID VAC JANSSEN AD26 0.5ML: CPT

## 2021-05-20 ENCOUNTER — VIRTUAL VISIT (OUTPATIENT)
Dept: FAMILY MEDICINE | Facility: CLINIC | Age: 56
End: 2021-05-20
Payer: COMMERCIAL

## 2021-05-20 DIAGNOSIS — B02.9 HERPES ZOSTER WITHOUT COMPLICATION: Primary | ICD-10-CM

## 2021-05-20 PROCEDURE — 99213 OFFICE O/P EST LOW 20 MIN: CPT | Mod: 95 | Performed by: NURSE PRACTITIONER

## 2021-05-20 RX ORDER — VALACYCLOVIR HYDROCHLORIDE 1 G/1
1000 TABLET, FILM COATED ORAL 3 TIMES DAILY
Qty: 21 TABLET | Refills: 0 | Status: SHIPPED | OUTPATIENT
Start: 2021-05-20 | End: 2021-05-27

## 2021-05-20 NOTE — PROGRESS NOTES
John is a 55 year old who is being evaluated via a billable video visit.      How would you like to obtain your AVS? MyChart  If the video visit is dropped, the invitation should be resent by: Send to e-mail at: [email or web address]  Will anyone else be joining your video visit? No    Video Start Time: 8:08    Assessment & Plan     Herpes zoster without complication  Patient with history of shingles- recommend to get zoster vaccine 1 month after symptoms are resolved  - valACYclovir (VALTREX) 1000 mg tablet; Take 1 tablet (1,000 mg) by mouth 3 times daily for 7 days          No follow-ups on file.    MERCED Ramos CNP  [facility]    Subjective   John is a 55 year old who presents for the following health issues  accompanied by himself:    HPI     Rash  Possible shingles  Onset/Duration: 3 days now.  Description  Location: Right leg, upper thigh.  Character: Lesions are starting to form.  Burning and painful.  Itching: They tend to itch, but tries not to itch them.  Intensity:  The size of a quarter currently.  Red area around it that is 4-5 inches.  Progression of Symptoms:  worsening  Accompanying signs and symptoms:   Fever: no, but the skin area is hot to the touch.  Body aches or joint pain: no  Sore throat symptoms: no  Recent cold symptoms: no  History:           Previous episodes of similar rash: History of shingles like this before that started like this.  This time the pain started in the area prior to the rash.  New exposures:  None  Recent travel: no  Exposure to similar rash: no  Precipitating or alleviating factors: If he were to itch the area it can make it worse.  Therapies tried and outcome: none        Review of Systems   Constitutional, HEENT, cardiovascular, pulmonary, GI, , musculoskeletal, neuro, skin, endocrine and psych systems are negative, except as otherwise noted.      Objective           Vitals:  No vitals were obtained today due to virtual  visit.    Physical Exam   GENERAL: Healthy, alert and no distress  EYES: Eyes grossly normal to inspection.  No discharge or erythema, or obvious scleral/conjunctival abnormalities.  RESP: No audible wheeze, cough, or visible cyanosis.  No visible retractions or increased work of breathing.    SKIN: Right upper thigh with dime size patch of vesicles   NEURO: Cranial nerves grossly intact.  Mentation and speech appropriate for age.  PSYCH: Mentation appears normal, affect normal/bright, judgement and insight intact, normal speech and appearance well-groomed.                Video-Visit Details    Type of service:  Video Visit    Video End Time:8:14 AM    Originating Location (pt. Location): Home    Distant Location (provider location):  [facility]     Platform used for Video Visit: [employer]

## 2021-09-04 ENCOUNTER — HEALTH MAINTENANCE LETTER (OUTPATIENT)
Age: 56
End: 2021-09-04

## 2021-11-05 ENCOUNTER — E-VISIT (OUTPATIENT)
Dept: URGENT CARE | Facility: CLINIC | Age: 56
End: 2021-11-05
Payer: COMMERCIAL

## 2021-11-05 DIAGNOSIS — Z20.822 SUSPECTED COVID-19 VIRUS INFECTION: Primary | ICD-10-CM

## 2021-11-05 PROCEDURE — 99421 OL DIG E/M SVC 5-10 MIN: CPT | Performed by: PHYSICIAN ASSISTANT

## 2021-11-05 NOTE — PATIENT INSTRUCTIONS
Dear Jez Barron,    Your symptoms show that you may have coronavirus (COVID-19). This illness can cause fever, cough and trouble breathing. Many people get a mild case and get better on their own. Some people can get very sick.    Will I be tested for COVID-19?  We would like to test you for Covid-19 virus. I have placed orders for this test.     To schedule: go to your [employer] home page and scroll down to the section that says  You have an appointment that needs to be scheduled  and click the large green button that says  Schedule Now  and follow the steps to find the next available openings.    If you are unable to complete these [employer] scheduling steps, please call [phone] to schedule your testing.     Return to work/school/ guidance:  Please let your workplace manager and staffing office know when your quarantine ends     We can t give you an exact date as it depends on the above. You can calculate this on your own or work with your manager/staffing office to calculate this. (For example if you were exposed on 10/4, you would have to quarantine for 14 full days. That would be through 10/18. You could return on 10/19.)      If you receive a positive COVID-19 test result, follow the guidance of the those who are giving you the results. Usually the return to work is 10 (or in some cases 20 days from symptom onset.) If you work at [facility], you must also be cleared by Employee Occupational Health and Safety to return to work.        If you receive a negative COVID-19 test result and did not have a high risk exposure to someone with a known positive COVID-19 test, you can return to work once you're free of fever for 24 hours without fever-reducing medication and your symptoms are improving or resolved.      If you receive a negative COVID-19 test and If you had a high risk exposure to someone who has tested positive for COVID-19 then you can return to work 14 days after your last contact  with the positive individual    Note: If you have ongoing exposure to the covid positive person, this quarantine period may be more than 14 days. (For example, if you are continued to be exposed to your child who tested positive and cannot isolate from them, then the quarantine of 7-14 days can't start until your child is no longer contagious. This is typically 10 days from onset of the child's symptoms. So the total duration may be 17-24 days in this case.)    Sign up for [employer].   We know it's scary to hear that you might have COVID-19. We want to track your symptoms to make sure you're okay over the next 2 weeks. Please look for an email from [employer]--this is a free, online program that we'll use to keep in touch. To sign up, follow the link in the email you will receive. Learn more at [email or web address]    How can I take care of myself?    Get lots of rest. Drink extra fluids (unless a doctor has told you not to)    Take Tylenol (acetaminophen) or ibuprofen for fever or pain. If you have liver or kidney problems, ask your family doctor if it's okay to take Tylenol o ibuprofen    If you have other health problems (like cancer, heart failure, an organ transplant or severe kidney disease): Call your specialty clinic if you don't feel better in the next 2 days.    Know when to call 911. Emergency warning signs include:  o Trouble breathing or shortness of breath  o Pain or pressure in the chest that doesn't go away  o Feeling confused like you haven't felt before, or not being able to wake up  o Bluish-colored lips or face    Where can I get more information?  [facility] - About COVID-19:   [email or web address]    CDC - What to Do If You're Sick:   www.cdc.gov/coronavirus/2019-ncov/about/steps-when-sick.html    November 5, 2021  RE:  Jez Fish [address]  [address] [place] [address]      To whom it may concern:    I evaluated Jez Barron on November 5, 2021. Jez Barron should be excused from work/school.     They should let their workplace manager and staffing office know when their quarantine ends.    We can not give an exact date as it depends on the information below. They can calculate this on their own or work with their manager/staffing office to calculate this. (For example if they were exposed on 10/04, they would have to quarantine for 14 full days. That would be through 10/18. They could return on 10/19.)    Quarantine Guidelines:      If patient receives a positive COVID-19 test result, they should follow the guidance of those who are giving the results. Usually the return to work is 10 (or in some cases 20 days from symptom onset.) If they work at [employer], they must be cleared by Employee Occupational Health and Safety to return to work.        If patient receives a negative COVID-19 test result and did not have a high risk exposure to someone with a known positive COVID-19 test, they can return to work once they're free of fever for 24 hours without fever-reducing medication and their symptoms are improving or resolved.      If patient receives a negative COVID-19 test and if they had a high risk exposure to someone who has tested positive for COVID-19 then they can return to work 14 days after their last contact with the positive individual    Note: If there is ongoing exposure to the covid positive person, this quarantine period may be longer than 14 days. (For example, if they are continually exposed to their child, who tested positive and cannot isolate from them, then the quarantine of 7-14 days can't start until their child is no longer contagious. This is typically 10 days from onset to the child's symptoms. So the total duration may be 17-24 days in this case.)     Sincerely,  Dennis Marcial,  [place]

## 2022-02-19 ENCOUNTER — HEALTH MAINTENANCE LETTER (OUTPATIENT)
Age: 57
End: 2022-02-19

## 2022-10-22 ENCOUNTER — HEALTH MAINTENANCE LETTER (OUTPATIENT)
Age: 57
End: 2022-10-22

## 2023-04-01 ENCOUNTER — HEALTH MAINTENANCE LETTER (OUTPATIENT)
Age: 58
End: 2023-04-01

## 2024-01-14 ENCOUNTER — HEALTH MAINTENANCE LETTER (OUTPATIENT)
Age: 59
End: 2024-01-14